# Patient Record
Sex: MALE | Race: BLACK OR AFRICAN AMERICAN | NOT HISPANIC OR LATINO | Employment: FULL TIME | URBAN - METROPOLITAN AREA
[De-identification: names, ages, dates, MRNs, and addresses within clinical notes are randomized per-mention and may not be internally consistent; named-entity substitution may affect disease eponyms.]

---

## 2023-11-16 ENCOUNTER — HOSPITAL ENCOUNTER (OUTPATIENT)
Facility: HOSPITAL | Age: 23
Discharge: HOME | End: 2023-11-16
Attending: EMERGENCY MEDICINE | Admitting: EMERGENCY MEDICINE

## 2023-11-16 VITALS
SYSTOLIC BLOOD PRESSURE: 151 MMHG | HEIGHT: 66 IN | TEMPERATURE: 98.6 F | DIASTOLIC BLOOD PRESSURE: 74 MMHG | OXYGEN SATURATION: 99 % | WEIGHT: 180 LBS | BODY MASS INDEX: 28.93 KG/M2 | HEART RATE: 74 BPM | RESPIRATION RATE: 18 BRPM

## 2023-11-16 DIAGNOSIS — J02.9 PHARYNGITIS, UNSPECIFIED ETIOLOGY: Primary | ICD-10-CM

## 2023-11-16 LAB — STREP A PCR: NOT DETECTED

## 2023-11-16 PROCEDURE — 87651 STREP A DNA AMP PROBE: CPT | Performed by: EMERGENCY MEDICINE

## 2023-11-16 PROCEDURE — G0463 HOSPITAL OUTPT CLINIC VISIT: HCPCS

## 2023-11-16 RX ORDER — METHYLPREDNISOLONE 4 MG/1
TABLET ORAL
Qty: 21 TABLET | Refills: 0 | Status: SHIPPED | OUTPATIENT
Start: 2023-11-16

## 2023-11-16 NOTE — Clinical Note
[facility]ON  [address] [facility] [address]    Saw Joon Hunt was seen and treated in our emergency department on 11/16/2023.  He may return to work on 11/17/2023.         Thank you for choosing [facility].    Zuri Sierra PA-C

## 2023-11-17 NOTE — FSED PROVIDER NOTE
Subjective   History of Present Illness  Patient is a 23-year-old male who presents to the emergency department for sore throat x2 days.  Associated cough, congestion.  Denies fever, shortness of breath, chest pain, abdominal symptoms.  Does not smoke or vape.        Review of Systems   Constitutional:  Negative for appetite change, chills, diaphoresis, fatigue and fever.   HENT:  Positive for congestion, rhinorrhea and sore throat. Negative for ear discharge, ear pain, sinus pressure and sinus pain.    Eyes:  Negative for pain and redness.   Respiratory:  Positive for cough. Negative for shortness of breath.    Cardiovascular:  Negative for chest pain.   Gastrointestinal:  Negative for abdominal pain, constipation, diarrhea, nausea and vomiting.   Skin:  Negative for color change, pallor, rash and wound.       History reviewed. No pertinent past medical history.    No Known Allergies    No past surgical history on file.    History reviewed. No pertinent family history.    Social History     Socioeconomic History    Marital status: Single           Objective   Physical Exam  Constitutional:       General: He is not in acute distress.     Appearance: He is normal weight. He is not ill-appearing, toxic-appearing or diaphoretic.   HENT:      Head: Normocephalic and atraumatic.      Right Ear: Tympanic membrane and ear canal normal.      Left Ear: Tympanic membrane and ear canal normal.      Nose: Nose normal.      Mouth/Throat:      Mouth: Mucous membranes are moist. No oral lesions.      Pharynx: Oropharynx is clear. No pharyngeal swelling, oropharyngeal exudate, posterior oropharyngeal erythema or uvula swelling.      Tonsils: No tonsillar exudate or tonsillar abscesses.   Cardiovascular:      Rate and Rhythm: Normal rate and regular rhythm.   Pulmonary:      Effort: Pulmonary effort is normal. No respiratory distress.      Breath sounds: Normal breath sounds. No stridor. No wheezing, rhonchi or rales.   Chest:       Chest wall: No tenderness.   Skin:     General: Skin is warm and dry.      Capillary Refill: Capillary refill takes less than 2 seconds.   Neurological:      Mental Status: He is alert.   Psychiatric:         Mood and Affect: Mood normal.         Behavior: Behavior normal.         Procedures           ED Course                                           Medical Decision Making  Patient is a 23-year-old male who presents for sore throat.  Exam is unremarkable.  He is well-appearing healthy young male in no acute distress, nontoxic.  Vital signs are WNL.  Lung sounds are clear.  Oropharynx and otic exams are unremarkable.  Strep swab is negative.  Patient needing work note.  Recommended steroid pack for symptoms.  Discussed when to return to the emergency department.  Answered all questions.  Patient verbalized understanding and was agreeable to plan and discharge.  MY differential diagnosis includes viral illness, bacterial illness, pharyngitis, tonsillitis, pneumonia, bronchitis, strep, otitis media    Problems Addressed:  Pharyngitis, unspecified etiology: complicated acute illness or injury    Risk  Prescription drug management.        Final diagnoses:   Pharyngitis, unspecified etiology       ED Disposition  ED Disposition       ED Disposition   Discharge    Condition   Stable    Comment   --               Provider, No Known  [place] [id number]               Medication List        New Prescriptions      methylPREDNISolone 4 MG dose pack  Commonly known as: MEDROL  6 tabs PO x1 day, 5 tabs PO x1 day, and continue decrease of 1 tablet/day.  6 days total treatment.               Where to Get Your Medications        These medications were sent to [facility] [id number] - [place], [place] - [address] AT [facility] - [phone]  - [phone]   [address] , [facility] [address]      Phone: [phone]   methylPREDNISolone 4 MG dose pack

## 2023-11-17 NOTE — DISCHARGE INSTRUCTIONS
Take steroid pack as prescribed to completion.  Use over-the-counter cough and sore throat medications as needed.  Use Tylenol and ibuprofen alternating every 4 hours as needed for pain and fever.  Return to emergency department for worsening symptoms or other medical emergencies.  Recommended follow-up with PCP.  Refer to the attached instructions for further information.

## 2024-05-15 ENCOUNTER — HOSPITAL ENCOUNTER (OUTPATIENT)
Facility: HOSPITAL | Age: 24
Discharge: HOME | End: 2024-05-15
Attending: EMERGENCY MEDICINE | Admitting: EMERGENCY MEDICINE

## 2024-05-15 VITALS
OXYGEN SATURATION: 99 % | TEMPERATURE: 98.4 F | DIASTOLIC BLOOD PRESSURE: 108 MMHG | BODY MASS INDEX: 31.82 KG/M2 | RESPIRATION RATE: 16 BRPM | WEIGHT: 198 LBS | HEIGHT: 66 IN | SYSTOLIC BLOOD PRESSURE: 171 MMHG | HEART RATE: 65 BPM

## 2024-05-15 DIAGNOSIS — J06.9 UPPER RESPIRATORY TRACT INFECTION, UNSPECIFIED TYPE: Primary | ICD-10-CM

## 2024-05-15 LAB
FLUAV SUBTYP SPEC NAA+PROBE: NOT DETECTED
FLUBV RNA ISLT QL NAA+PROBE: NOT DETECTED
SARS-COV-2 RNA RESP QL NAA+PROBE: NOT DETECTED

## 2024-05-15 PROCEDURE — 25010000002 DEXAMETHASONE PER 1 MG

## 2024-05-15 PROCEDURE — 87636 SARSCOV2 & INF A&B AMP PRB: CPT | Performed by: EMERGENCY MEDICINE

## 2024-05-15 PROCEDURE — G0463 HOSPITAL OUTPT CLINIC VISIT: HCPCS

## 2024-05-15 RX ORDER — DOXYCYCLINE 100 MG/1
100 CAPSULE ORAL 2 TIMES DAILY
Qty: 14 CAPSULE | Refills: 0 | Status: SHIPPED | OUTPATIENT
Start: 2024-05-15 | End: 2024-05-22

## 2024-05-15 RX ADMIN — DEXAMETHASONE SODIUM PHOSPHATE 10 MG: 10 INJECTION, SOLUTION INTRAMUSCULAR; INTRAVENOUS at 19:10

## 2024-05-15 NOTE — DISCHARGE INSTRUCTIONS
You are negative for COVID and flu, but your symptoms are very likely to be viral and should resolve within the next couple of days, especially with the steroid we have given you today.  Increase electrolyte fluids and small bland meals.  Increase use of over-the-counter cold and cough medications, and allergy medications.  However, if you are not improved in 2 to 3 days, start antibiotic and take as prescribed until completion.  Continue to monitor your symptoms, and return to emergency department for worsening symptoms or other medical emergencies.  Recommended follow-up with PCP.  Refer to the attached instructions for further information.

## 2024-05-15 NOTE — FSED PROVIDER NOTE
"Subjective   History of Present Illness  Patient is a 24-year-old male who presents to the emergency department for ill symptoms ongoing x 1 week.  Reports congestion, postnasal drainage, fatigue, body aches.  Mild cough due to the postnasal drainage.  Patient reports fever that has since resolved.  Patient feels he is generally improving, but wanted to \"get checked out.\"  Had an episode of diarrhea yesterday.  Girlfriend is sick with similar symptoms.        Review of Systems   Constitutional:  Positive for fatigue and fever. Negative for appetite change, chills and diaphoresis.   HENT:  Positive for congestion, postnasal drip, rhinorrhea and sore throat. Negative for ear discharge, ear pain, sinus pressure, sinus pain, trouble swallowing and voice change.    Eyes:  Negative for photophobia, pain, redness and itching.   Respiratory:  Positive for cough and shortness of breath.    Cardiovascular:  Negative for chest pain.   Gastrointestinal:  Negative for abdominal pain, constipation, diarrhea, nausea and vomiting.   Genitourinary:  Negative for difficulty urinating and dysuria.   Musculoskeletal:  Positive for myalgias. Negative for neck pain and neck stiffness.   Skin:  Negative for color change, pallor, rash and wound.   Neurological:  Negative for headaches.       History reviewed. No pertinent past medical history.    No Known Allergies    History reviewed. No pertinent surgical history.    History reviewed. No pertinent family history.    Social History     Socioeconomic History    Marital status: Single           Objective   Physical Exam  Constitutional:       General: He is not in acute distress.     Appearance: He is normal weight. He is not toxic-appearing.   HENT:      Right Ear: Tympanic membrane, ear canal and external ear normal.      Left Ear: Tympanic membrane, ear canal and external ear normal.      Nose: Congestion and rhinorrhea present.   Eyes:      Extraocular Movements: Extraocular movements " intact.      Conjunctiva/sclera: Conjunctivae normal.      Pupils: Pupils are equal, round, and reactive to light.   Cardiovascular:      Rate and Rhythm: Normal rate and regular rhythm.   Pulmonary:      Effort: Pulmonary effort is normal. No respiratory distress.      Breath sounds: Normal breath sounds. No stridor. No wheezing, rhonchi or rales.   Skin:     General: Skin is warm and dry.   Neurological:      General: No focal deficit present.      Mental Status: He is alert.   Psychiatric:         Mood and Affect: Mood normal.         Behavior: Behavior normal.         Procedures           ED Course  ED Course as of 05/15/24 1905   Wed May 15, 2024   1904 COVID19: Not Detected [AS]   1904 Influenza A PCR: Not Detected [AS]   1904 Influenza B PCR: Not Detected [AS]      ED Course User Index  [AS] Zuri Sierra, MIRANDA                                           Medical Decision Making  Patient is a 24-year-old male who presents with upper respiratory and ill symptoms ongoing 1 week.  He overall appears well, no acute distress, nontoxic.  Mildly hypertensive.  Other vital signs WNL.  Exam is largely unremarkable and nonconcerning.  Symptoms are overall improving.  Very consistent with viral illness.  Negative COVID and flu.  Will prescribe antibiotics and a wait-and-see attempt as patient should improve by day 10.  Discussed when to return to the emergency department.  Answered all questions.  Patient verbalized understanding and was agreeable to plan and discharge.    My differential diagnosis includes was not limited to: URI, viral illness, bacterial illness, sinusitis, otitis media, pharyngitis, tonsillitis, allergies, pneumonia, bronchitis    Problems Addressed:  Upper respiratory tract infection, unspecified type: complicated acute illness or injury    Risk  Prescription drug management.        Final diagnoses:   Upper respiratory tract infection, unspecified type       ED Disposition  ED Disposition       ED  Disposition   Discharge    Condition   Stable    Comment   --               Provider, No Known  [place] [address]               Medication List        New Prescriptions      doxycycline 100 MG capsule  Commonly known as: MONODOX  Take 1 capsule by mouth 2 (Two) Times a Day for 7 days.               Where to Get Your Medications        These medications were sent to [facility] [id number] - [place], [place] - [address] AT [facility] - [phone]  - [phone]   [address], [facility] [address]      Phone: [phone]   doxycycline 100 MG capsule

## 2024-05-15 NOTE — Clinical Note
[facility]  [address] [facility] [address]    Saw Joon Hunt was seen and treated in our emergency department on 5/15/2024.  He may return to work on 05/17/2024.         Thank you for choosing [facility].    Kellerman, Makinzie, RN

## 2025-07-08 ENCOUNTER — APPOINTMENT (OUTPATIENT)
Dept: CT IMAGING | Facility: HOSPITAL | Age: 25
End: 2025-07-08
Payer: MEDICAID

## 2025-07-08 ENCOUNTER — HOSPITAL ENCOUNTER (EMERGENCY)
Facility: HOSPITAL | Age: 25
Discharge: HOME | End: 2025-07-08
Attending: EMERGENCY MEDICINE | Admitting: EMERGENCY MEDICINE
Payer: MEDICAID

## 2025-07-08 VITALS
WEIGHT: 180 LBS | SYSTOLIC BLOOD PRESSURE: 131 MMHG | HEIGHT: 67 IN | BODY MASS INDEX: 28.25 KG/M2 | DIASTOLIC BLOOD PRESSURE: 77 MMHG | TEMPERATURE: 98.5 F | HEART RATE: 63 BPM | RESPIRATION RATE: 18 BRPM | OXYGEN SATURATION: 99 %

## 2025-07-08 DIAGNOSIS — R10.10 PAIN OF UPPER ABDOMEN: Primary | ICD-10-CM

## 2025-07-08 LAB
ALBUMIN SERPL-MCNC: 5.1 G/DL (ref 3.5–5.2)
ALBUMIN/GLOB SERPL: 2 G/DL
ALP SERPL-CCNC: 70 U/L (ref 39–117)
ALT SERPL W P-5'-P-CCNC: 20 U/L (ref 1–41)
ANION GAP SERPL CALCULATED.3IONS-SCNC: 12.6 MMOL/L (ref 5–15)
AST SERPL-CCNC: 22 U/L (ref 1–40)
BACTERIA UR QL AUTO: ABNORMAL /HPF
BASOPHILS # BLD AUTO: 0.02 10*3/MM3 (ref 0–0.2)
BASOPHILS NFR BLD AUTO: 0.3 % (ref 0–1.5)
BILIRUB SERPL-MCNC: 0.7 MG/DL (ref 0–1.2)
BILIRUB UR QL STRIP: ABNORMAL
BUN SERPL-MCNC: 14.8 MG/DL (ref 6–20)
BUN/CREAT SERPL: 14.5 (ref 7–25)
C TRACH DNA SPEC QL NAA+PROBE: NOT DETECTED
CALCIUM SPEC-SCNC: 9.9 MG/DL (ref 8.6–10.5)
CHLORIDE SERPL-SCNC: 107 MMOL/L (ref 98–107)
CLARITY UR: CLEAR
CO2 SERPL-SCNC: 19.4 MMOL/L (ref 22–29)
COLOR UR: YELLOW
CREAT SERPL-MCNC: 1.02 MG/DL (ref 0.76–1.27)
D-LACTATE SERPL-SCNC: 0.9 MMOL/L (ref 0.5–2)
DEPRECATED RDW RBC AUTO: 43.9 FL (ref 37–54)
EGFRCR SERPLBLD CKD-EPI 2021: 104 ML/MIN/1.73
EOSINOPHIL # BLD AUTO: 0.06 10*3/MM3 (ref 0–0.4)
EOSINOPHIL NFR BLD AUTO: 1 % (ref 0.3–6.2)
ERYTHROCYTE [DISTWIDTH] IN BLOOD BY AUTOMATED COUNT: 13.9 % (ref 12.3–15.4)
GEN 5 1HR TROPONIN T REFLEX: <6 NG/L
GLOBULIN UR ELPH-MCNC: 2.5 GM/DL
GLUCOSE SERPL-MCNC: 93 MG/DL (ref 65–99)
GLUCOSE UR STRIP-MCNC: NEGATIVE MG/DL
HCT VFR BLD AUTO: 50.1 % (ref 37.5–51)
HGB BLD-MCNC: 15.9 G/DL (ref 13–17.7)
HGB UR QL STRIP.AUTO: ABNORMAL
HOLD SPECIMEN: NORMAL
HYALINE CASTS UR QL AUTO: ABNORMAL /LPF
IMM GRANULOCYTES # BLD AUTO: 0.01 10*3/MM3 (ref 0–0.05)
IMM GRANULOCYTES NFR BLD AUTO: 0.2 % (ref 0–0.5)
KETONES UR QL STRIP: ABNORMAL
LEUKOCYTE ESTERASE UR QL STRIP.AUTO: NEGATIVE
LIPASE SERPL-CCNC: 23 U/L (ref 13–60)
LYMPHOCYTES # BLD AUTO: 2.62 10*3/MM3 (ref 0.7–3.1)
LYMPHOCYTES NFR BLD AUTO: 42.8 % (ref 19.6–45.3)
MCH RBC QN AUTO: 26.8 PG (ref 26.6–33)
MCHC RBC AUTO-ENTMCNC: 31.7 G/DL (ref 31.5–35.7)
MCV RBC AUTO: 84.3 FL (ref 79–97)
MONOCYTES # BLD AUTO: 0.45 10*3/MM3 (ref 0.1–0.9)
MONOCYTES NFR BLD AUTO: 7.4 % (ref 5–12)
N GONORRHOEA RRNA SPEC QL NAA+PROBE: NOT DETECTED
NEUTROPHILS NFR BLD AUTO: 2.96 10*3/MM3 (ref 1.7–7)
NEUTROPHILS NFR BLD AUTO: 48.3 % (ref 42.7–76)
NITRITE UR QL STRIP: NEGATIVE
PH UR STRIP.AUTO: 5.5 [PH] (ref 5–8)
PLATELET # BLD AUTO: 226 10*3/MM3 (ref 140–450)
PMV BLD AUTO: 10.9 FL (ref 6–12)
POTASSIUM SERPL-SCNC: 4.4 MMOL/L (ref 3.5–5.2)
PROT SERPL-MCNC: 7.6 G/DL (ref 6–8.5)
PROT UR QL STRIP: NEGATIVE
QT INTERVAL: 391 MS
QTC INTERVAL: 388 MS
RBC # BLD AUTO: 5.94 10*6/MM3 (ref 4.14–5.8)
RBC # UR STRIP: ABNORMAL /HPF
REF LAB TEST METHOD: ABNORMAL
SODIUM SERPL-SCNC: 139 MMOL/L (ref 136–145)
SP GR UR STRIP: >=1.03 (ref 1–1.03)
SQUAMOUS #/AREA URNS HPF: ABNORMAL /HPF
TROPONIN T NUMERIC DELTA: NORMAL
TROPONIN T SERPL HS-MCNC: <6 NG/L
UROBILINOGEN UR QL STRIP: ABNORMAL
WBC # UR STRIP: ABNORMAL /HPF
WBC NRBC COR # BLD AUTO: 6.12 10*3/MM3 (ref 3.4–10.8)

## 2025-07-08 PROCEDURE — 36415 COLL VENOUS BLD VENIPUNCTURE: CPT

## 2025-07-08 PROCEDURE — 83690 ASSAY OF LIPASE: CPT

## 2025-07-08 PROCEDURE — 74177 CT ABD & PELVIS W/CONTRAST: CPT

## 2025-07-08 PROCEDURE — 85025 COMPLETE CBC W/AUTO DIFF WBC: CPT

## 2025-07-08 PROCEDURE — 84484 ASSAY OF TROPONIN QUANT: CPT

## 2025-07-08 PROCEDURE — 80053 COMPREHEN METABOLIC PANEL: CPT

## 2025-07-08 PROCEDURE — 81001 URINALYSIS AUTO W/SCOPE: CPT | Performed by: EMERGENCY MEDICINE

## 2025-07-08 PROCEDURE — 25510000001 IOPAMIDOL PER 1 ML: Performed by: EMERGENCY MEDICINE

## 2025-07-08 PROCEDURE — 83605 ASSAY OF LACTIC ACID: CPT

## 2025-07-08 PROCEDURE — 93005 ELECTROCARDIOGRAM TRACING: CPT

## 2025-07-08 PROCEDURE — 87491 CHLMYD TRACH DNA AMP PROBE: CPT

## 2025-07-08 PROCEDURE — 87591 N.GONORRHOEAE DNA AMP PROB: CPT

## 2025-07-08 PROCEDURE — 99285 EMERGENCY DEPT VISIT HI MDM: CPT

## 2025-07-08 PROCEDURE — 96360 HYDRATION IV INFUSION INIT: CPT

## 2025-07-08 PROCEDURE — 25810000003 SODIUM CHLORIDE 0.9 % SOLUTION

## 2025-07-08 RX ORDER — IOPAMIDOL 755 MG/ML
100 INJECTION, SOLUTION INTRAVASCULAR
Status: COMPLETED | OUTPATIENT
Start: 2025-07-08 | End: 2025-07-08

## 2025-07-08 RX ADMIN — IOPAMIDOL 85 ML: 755 INJECTION, SOLUTION INTRAVENOUS at 11:21

## 2025-07-08 RX ADMIN — SODIUM CHLORIDE 1000 ML: 9 INJECTION, SOLUTION INTRAVENOUS at 10:55

## 2025-07-08 NOTE — FSED PROVIDER NOTE
Subjective   History of Present Illness  25-year-old male with no reported significant medical history presents for evaluation of upper abdominal pain x 4 days.  Patient states the pain initially started on July 4 after he took a shot of alcohol.  The pain initiated in the right upper quadrant of his abdomen, however has since migrated into the left upper quadrant.  He is also endorsing some associated loose stools.  He describes his pain as intermittent.  There are no known aggravating or alleviating factors.  He does not take any daily medications.  He denies any fevers, dizziness, weakness, nausea, vomiting, black/bloody stools, dysuria, chest pain, shortness of breath, or any other associated symptoms.    History provided by:  Patient      Review of Systems   Constitutional:  Negative for chills, fatigue and fever.   Respiratory:  Negative for cough and shortness of breath.    Cardiovascular:  Negative for chest pain and leg swelling.   Gastrointestinal:  Positive for abdominal pain. Negative for blood in stool, constipation, nausea, rectal pain and vomiting.        Positive for loose stool   Genitourinary:  Negative for difficulty urinating and flank pain.   Musculoskeletal: Negative.    Skin:  Negative for color change and pallor.   Neurological:  Negative for dizziness, syncope, weakness, light-headedness and headaches.   All other systems reviewed and are negative.      History reviewed. No pertinent past medical history.    No Known Allergies    History reviewed. No pertinent surgical history.    History reviewed. No pertinent family history.    Social History     Socioeconomic History    Marital status: Single           Objective   Physical Exam  Vitals and nursing note reviewed.   Constitutional:       Appearance: Normal appearance. He is not ill-appearing or toxic-appearing.   HENT:      Head: Normocephalic and atraumatic.   Eyes:      Pupils: Pupils are equal, round, and reactive to light.    Cardiovascular:      Rate and Rhythm: Normal rate and regular rhythm.   Pulmonary:      Effort: Pulmonary effort is normal. No respiratory distress.      Breath sounds: Normal breath sounds.   Abdominal:      General: Abdomen is flat. Bowel sounds are normal.      Palpations: Abdomen is soft.      Tenderness: There is abdominal tenderness in the right upper quadrant and left upper quadrant. There is no right CVA tenderness, left CVA tenderness, guarding or rebound. Negative signs include Muñoz's sign, McBurney's sign, psoas sign and obturator sign.   Musculoskeletal:         General: Normal range of motion.      Cervical back: Normal range of motion and neck supple.   Skin:     General: Skin is warm and dry.      Coloration: Skin is not pale.   Neurological:      General: No focal deficit present.      Mental Status: He is alert and oriented to person, place, and time.   Psychiatric:         Mood and Affect: Mood normal.         Procedures       Medical Decision Making  25-year-old male presents for evaluation of intermittent abdominal pain x 4 days.  There is very mild diffuse tenderness to palpation of the upper abdominal cavity, physical exam otherwise unremarkable.  Muñoz's negative, no rebound tenderness.  Urinalysis has already resulted with an elevated specific gravity, trace ketones, small bilirubin, and trace blood.  This could simply be related to dehydration however could also be infectious.  Patient denies any dysuria or discharge but I did recommend adding on gonorrhea and Chlamydia testing to rule out infection as this can sometimes present atypically in men and sometimes without the expected symptoms. Given reported history of right upper quadrant abdominal pain and presence of bilirubin and blood in urine, will also need to rule out cholecystitis, pancreatitis, ureterolithiasis with an abdominal/pelvis CT scan.  Patient also verbalizing left upper quadrant pain without significant tenderness on  examination, therefore recommended a single serum troponin and a twelve-lead ECG.  Other possible differentials include gastroenteritis or gastritis.  I recommended a liter of IV fluids and IV analgesia.  Patient agreeable with fluids, however declines analgesia at this time.  Plan of care has been discussed with the patient, he is in agreement.    Amount and/or Complexity of Data Reviewed  Labs: ordered. Decision-making details documented in ED Course.  Radiology: ordered. Decision-making details documented in ED Course.  ECG/medicine tests: ordered. Decision-making details documented in ED Course.      ED Course  ED Course as of 07/08/25 1259   Tue Jul 08, 2025   1032 BP: 158/95 [SS]   1032 Temp: 98.5 °F (36.9 °C) [SS]   1032 Heart Rate: 74 [SS]   1032 Resp: 16 [SS]   1032 SpO2: 98 % [SS]   1032 Blood, UA(!): Trace [SS]   1032 Bilirubin, UA(!): Small (1+) [SS]   1032 Ketones, UA(!): Trace [SS]   1032 Specific Gravity, UA: >=1.030 [SS]   1126 Patient's EKG shows normal sinus rhythm.  The rate is 59 bpm.  There is diffuse ST segment elevation consistent with early repolarization pattern.  No ectopy.  Intervals are normal. [KZ]   1238 WBC: 6.12 [SS]   1238 RBC(!): 5.94  Further solidifies dehydration [SS]   1238 HS Troponin T: <6 [SS]   1238 Lipase: 23 [SS]   1238 Lactate: 0.9 [SS]   1238 BP: 131/77 [SS]   1238 Heart Rate: 63 [SS]   1238 Resp: 18 [SS]   1238 SpO2: 99 % [SS]   1239 Patient CT abdomen and pelvis was reviewed.  Although the appendix measures 7 mm, this is at the upper limits of normal.  There is no surrounding changes.  Nothing on exam to indicate appendicitis. [KZ]   1256 I discussed findings from today's exam with the patient at bedside.  Once again, there is no clinical evidence of acute appendicitis.  Workup in the ED is overall negative and reassuring for nonemergent etiology.  I recommended follow-up outpatient with PCP.  Patient agreeable.  Patient is afebrile and nontoxic in appearance.   Tolerating p.o. fluids without difficulty.  Stable and agreeable with discharge.  Patient instructed to return at anytime for new, worrisome, or worsening symptoms.  Educated on red flag signs and symptoms that warrant immediate return.  All questions and concerns have been addressed.  Patient agreeable with the proposed plan of care. [SS]      ED Course User Index  [KZ] Tucker Merida MD  [SS] Helen John APRN                                 Results for orders placed or performed during the hospital encounter of 07/08/25   Urinalysis With Culture If Indicated - Urine, Clean Catch    Collection Time: 07/08/25 10:17 AM    Specimen: Urine, Clean Catch   Result Value Ref Range    Color, UA Yellow Yellow, Straw    Appearance, UA Clear Clear    pH, UA 5.5 5.0 - 8.0    Specific Gravity, UA >=1.030 1.005 - 1.030    Glucose, UA Negative Negative    Ketones, UA Trace (A) Negative    Bilirubin, UA Small (1+) (A) Negative    Blood, UA Trace (A) Negative    Protein, UA Negative Negative    Leuk Esterase, UA Negative Negative    Nitrite, UA Negative Negative    Urobilinogen, UA 0.2 E.U./dL 0.2 - 1.0 E.U./dL   Comprehensive Metabolic Panel    Collection Time: 07/08/25 10:52 AM    Specimen: Blood   Result Value Ref Range    Glucose 93 65 - 99 mg/dL    BUN 14.8 6.0 - 20.0 mg/dL    Creatinine 1.02 0.76 - 1.27 mg/dL    Sodium 139 136 - 145 mmol/L    Potassium 4.4 3.5 - 5.2 mmol/L    Chloride 107 98 - 107 mmol/L    CO2 19.4 (L) 22.0 - 29.0 mmol/L    Calcium 9.9 8.6 - 10.5 mg/dL    Total Protein 7.6 6.0 - 8.5 g/dL    Albumin 5.1 3.5 - 5.2 g/dL    ALT (SGPT) 20 1 - 41 U/L    AST (SGOT) 22 1 - 40 U/L    Alkaline Phosphatase 70 39 - 117 U/L    Total Bilirubin 0.7 0.0 - 1.2 mg/dL    Globulin 2.5 gm/dL    A/G Ratio 2.0 g/dL    BUN/Creatinine Ratio 14.5 7.0 - 25.0    Anion Gap 12.6 5.0 - 15.0 mmol/L    eGFR 104.0 >60.0 mL/min/1.73   Lactic Acid, Plasma    Collection Time: 07/08/25 10:52 AM    Specimen: Blood   Result Value Ref Range     Lactate 0.9 0.5 - 2.0 mmol/L   High Sensitivity Troponin T    Collection Time: 07/08/25 10:52 AM    Specimen: Blood   Result Value Ref Range    HS Troponin T <6 <22 ng/L   Lipase    Collection Time: 07/08/25 10:52 AM    Specimen: Blood   Result Value Ref Range    Lipase 23 13 - 60 U/L   CBC Auto Differential    Collection Time: 07/08/25 10:52 AM    Specimen: Blood   Result Value Ref Range    WBC 6.12 3.40 - 10.80 10*3/mm3    RBC 5.94 (H) 4.14 - 5.80 10*6/mm3    Hemoglobin 15.9 13.0 - 17.7 g/dL    Hematocrit 50.1 37.5 - 51.0 %    MCV 84.3 79.0 - 97.0 fL    MCH 26.8 26.6 - 33.0 pg    MCHC 31.7 31.5 - 35.7 g/dL    RDW 13.9 12.3 - 15.4 %    RDW-SD 43.9 37.0 - 54.0 fl    MPV 10.9 6.0 - 12.0 fL    Platelets 226 140 - 450 10*3/mm3    Neutrophil % 48.3 42.7 - 76.0 %    Lymphocyte % 42.8 19.6 - 45.3 %    Monocyte % 7.4 5.0 - 12.0 %    Eosinophil % 1.0 0.3 - 6.2 %    Basophil % 0.3 0.0 - 1.5 %    Immature Grans % 0.2 0.0 - 0.5 %    Neutrophils, Absolute 2.96 1.70 - 7.00 10*3/mm3    Lymphocytes, Absolute 2.62 0.70 - 3.10 10*3/mm3    Monocytes, Absolute 0.45 0.10 - 0.90 10*3/mm3    Eosinophils, Absolute 0.06 0.00 - 0.40 10*3/mm3    Basophils, Absolute 0.02 0.00 - 0.20 10*3/mm3    Immature Grans, Absolute 0.01 0.00 - 0.05 10*3/mm3   ECG 12 Lead Other; Abd Pain    Collection Time: 07/08/25 11:11 AM   Result Value Ref Range    QT Interval 391 ms    QTC Interval 388 ms   High Sensitivity Troponin T 1Hr    Collection Time: 07/08/25 12:02 PM    Specimen: Blood   Result Value Ref Range    HS Troponin T <6 <22 ng/L    Troponin T Numeric Delta                   Final diagnoses:   Pain of upper abdomen       ED Disposition  ED Disposition       ED Disposition   Discharge    Condition   Stable    Comment   --               Lizzy Barlow, APRN  [address] [place] [address]  [phone]    In 1 week           Medication List      No changes were made to your prescriptions during this visit.

## 2025-07-08 NOTE — DISCHARGE INSTRUCTIONS
An emergency department evaluation is intended to rule out life-threatening conditions; this is not a complete evaluation. Understand that your healthcare does not end here today. It is important that your primary care physician be made aware of your visit. I recommend calling your primary care provider as soon as possible to arrange follow-up care & to discuss your ED visit. Additional testing or procedures may be necessary as an outpatient at the discretion of your primary care provider.     Please follow-up with the primary care provider you have been referred to as soon as possible.  Return at anytime for new, worrisome, or worsening symptoms.  Thank you for allowing us to take part in your care today.

## 2025-07-10 ENCOUNTER — HOSPITAL ENCOUNTER (EMERGENCY)
Facility: HOSPITAL | Age: 25
Discharge: HOME | End: 2025-07-11
Attending: EMERGENCY MEDICINE
Payer: MEDICAID

## 2025-07-10 VITALS
WEIGHT: 180 LBS | BODY MASS INDEX: 28.19 KG/M2 | RESPIRATION RATE: 16 BRPM | OXYGEN SATURATION: 99 % | DIASTOLIC BLOOD PRESSURE: 81 MMHG | SYSTOLIC BLOOD PRESSURE: 146 MMHG | TEMPERATURE: 98.1 F | HEART RATE: 62 BPM

## 2025-07-10 DIAGNOSIS — K21.9 GASTROESOPHAGEAL REFLUX DISEASE WITHOUT ESOPHAGITIS: Primary | ICD-10-CM

## 2025-07-10 PROCEDURE — 93005 ELECTROCARDIOGRAM TRACING: CPT | Performed by: EMERGENCY MEDICINE

## 2025-07-10 PROCEDURE — 99283 EMERGENCY DEPT VISIT LOW MDM: CPT

## 2025-07-10 PROCEDURE — 93010 ELECTROCARDIOGRAM REPORT: CPT | Performed by: INTERNAL MEDICINE

## 2025-07-10 RX ORDER — LIDOCAINE HYDROCHLORIDE 20 MG/ML
5 SOLUTION OROPHARYNGEAL ONCE
Status: COMPLETED | OUTPATIENT
Start: 2025-07-11 | End: 2025-07-10

## 2025-07-10 RX ADMIN — LIDOCAINE HYDROCHLORIDE 5 ML: 20 SOLUTION ORAL at 23:49

## 2025-07-10 RX ADMIN — ALUMINUM HYDROXIDE, MAGNESIUM HYDROXIDE, AND DIMETHICONE: 400; 400; 40 SUSPENSION ORAL at 23:49

## 2025-07-10 NOTE — Clinical Note
[facility]  [address]  [facility] [address]    Saw Joon Hunt was seen and treated in our emergency department on 7/10/2025.  He may return to work on 07/12/2025.         Thank you for choosing [facility].    Tien Castrejon MD

## 2025-07-11 LAB
QT INTERVAL: 353 MS
QTC INTERVAL: 356 MS

## 2025-07-11 PROCEDURE — 99283 EMERGENCY DEPT VISIT LOW MDM: CPT | Performed by: EMERGENCY MEDICINE

## 2025-07-11 RX ORDER — SUCRALFATE ORAL 1 G/10ML
1 SUSPENSION ORAL 4 TIMES DAILY
Qty: 420 ML | Refills: 0 | Status: SHIPPED | OUTPATIENT
Start: 2025-07-11

## 2025-07-11 RX ORDER — OMEPRAZOLE 20 MG/1
20 CAPSULE, DELAYED RELEASE ORAL DAILY
Qty: 15 CAPSULE | Refills: 0 | Status: SHIPPED | OUTPATIENT
Start: 2025-07-11

## 2025-07-11 NOTE — DISCHARGE INSTRUCTIONS
Avoid caffeine nicotine and red sauce red wine as these make GERD worse.  Use the Carafate to coat your stomach the Prilosec to stop the acid from being produced.  Follow-up with the referral.

## 2025-07-11 NOTE — [facility] PROVIDER NOTE
Subjective   History of Present Illness  Patient was here earlier today and had a workup.  He was told on CT scan that his baby too early to see pancreatitis.  The patient was having some epigastric pain not really having chest pain.  He does move up into his chest a little the typical GERD sign when they go up and down with her hand from the epigastrium is where they start.  It is exactly what he did.  Patient does drink caffeine and he does use marijuana.  Patient stopped using the marijuana because it was starting to bother his belly and and he started back up again.      Review of Systems   Gastrointestinal:  Positive for abdominal pain.   All other systems reviewed and are negative.      History reviewed. No pertinent past medical history.    No Known Allergies    History reviewed. No pertinent surgical history.    History reviewed. No pertinent family history.    Social History     Socioeconomic History    Marital status: Single           Objective   Physical Exam  Vitals and nursing note reviewed.   Constitutional:       General: He is not in acute distress.     Appearance: He is well-developed. He is not ill-appearing, toxic-appearing or diaphoretic.   HENT:      Head: Normocephalic and atraumatic.   Eyes:      Extraocular Movements: Extraocular movements intact.      Pupils: Pupils are equal, round, and reactive to light.   Cardiovascular:      Rate and Rhythm: Normal rate and regular rhythm.   Pulmonary:      Effort: Pulmonary effort is normal. No tachypnea.      Breath sounds: Normal breath sounds. No decreased breath sounds, wheezing, rhonchi or rales.   Chest:      Chest wall: No tenderness.   Abdominal:      General: Bowel sounds are normal.      Palpations: Abdomen is soft.      Tenderness: There is abdominal tenderness.      Comments: Tenderness in the epigastrium reproduces tenderness   Musculoskeletal:      Cervical back: Normal range of motion and neck supple.   Skin:     General: Skin is warm.       Capillary Refill: Capillary refill takes less than 2 seconds.   Neurological:      Mental Status: He is alert and oriented to person, place, and time.   Psychiatric:         Mood and Affect: Mood is anxious.         Procedures           ED Course                                           Medical Decision Making  Patient really does not have any tenderness anymore.  His EKG is normal but he is describing GERD.  I will place him on Prevacid and Carafate and discharged him no more caffeine and add sauces.    Problems Addressed:  Gastroesophageal reflux disease without esophagitis: complicated acute illness or injury    Amount and/or Complexity of Data Reviewed  ECG/medicine tests: ordered.     Details: EKG interpreted by myself shows a normal sinus rhythm with a ventricular rate of 61 ND interval is 140 ms QT corrected is 356 ms patient has early repolarization.    Risk  Prescription drug management.        Final diagnoses:   Gastroesophageal reflux disease without esophagitis       ED Disposition  ED Disposition       ED Disposition   Discharge    Condition   Stable    Comment   --               Provider, No Known  [place] [id number]    In 1 week           Medication List        New Prescriptions      omeprazole 20 MG capsule  Commonly known as: priLOSEC  Take 1 capsule by mouth Daily.     sucralfate 1 GM/10ML suspension  Commonly known as: CARAFATE  Take 10 mL by mouth 4 (Four) Times a Day.               Where to Get Your Medications        These medications were sent to [facility] [id number] - [place], [place] - [address] AT [facility] - [phone]  - [phone]   [address] [facility], [facility] [address]      Phone: [phone]   omeprazole 20 MG capsule  sucralfate 1 GM/10ML suspension